# Patient Record
Sex: FEMALE
[De-identification: names, ages, dates, MRNs, and addresses within clinical notes are randomized per-mention and may not be internally consistent; named-entity substitution may affect disease eponyms.]

---

## 2023-01-31 ENCOUNTER — NURSE TRIAGE (OUTPATIENT)
Dept: OTHER | Facility: CLINIC | Age: 33
End: 2023-01-31

## 2023-01-31 NOTE — TELEPHONE ENCOUNTER
Location of [place]: [place]    Received call from [place] at [facility] with Red Flag Complaint. Subjective: Caller states \"I have had these symptoms about 2 weeks but I ran out of my thyroid medication about 2 weeks ago. \"     Current Symptoms: fatigue, body feels cold, brain fog,     Onset: 2 weeks ago;     Associated Symptoms: NA    Pain Severity: 3/10; generalized body aches      Temperature: None     What has been tried: going to bed earlier, ibuprofen     LMP: 01/09/2023 Pregnant: None    Recommended disposition: See PCP within 3 Days - recommending [facility] if unable to get in. Care advice provided, patient verbalizes understanding; denies any other questions or concerns; instructed to call back for any new or worsening symptoms. Patient/Caller agrees with recommended disposition; writer provided warm transfer to [place] at [facility] for appointment scheduling    Attention Provider: Thank you for allowing me to participate in the care of your patient. The patient was connected to triage in response to information provided to the ECC. Please do not respond through this encounter as the response is not directed to a shared pool.     Reason for Disposition   MILD weakness (i.e., does not interfere with ability to work, go to school, normal activities) and persists > 1 week    Protocols used: Weakness (Generalized) and Fatigue-ADULT-[place]